# Patient Record
Sex: MALE | Race: WHITE
[De-identification: names, ages, dates, MRNs, and addresses within clinical notes are randomized per-mention and may not be internally consistent; named-entity substitution may affect disease eponyms.]

---

## 2023-04-11 ENCOUNTER — HOSPITAL ENCOUNTER (OUTPATIENT)
Dept: HOSPITAL 47 - RADXRMAIN | Age: 53
Discharge: HOME | End: 2023-04-11
Attending: FAMILY MEDICINE
Payer: COMMERCIAL

## 2023-04-11 DIAGNOSIS — M47.814: ICD-10-CM

## 2023-04-11 DIAGNOSIS — M43.16: ICD-10-CM

## 2023-04-11 DIAGNOSIS — M51.37: Primary | ICD-10-CM

## 2023-04-11 DIAGNOSIS — M47.816: ICD-10-CM

## 2023-04-11 PROCEDURE — 72072 X-RAY EXAM THORAC SPINE 3VWS: CPT

## 2023-04-11 PROCEDURE — 72110 X-RAY EXAM L-2 SPINE 4/>VWS: CPT

## 2023-04-11 NOTE — XR
EXAM TYPE: LUMBAR SPINE X RAY SERIES

 

COMPARISON: NONE

 

HISTORY: Pain

 

TECHNIQUE: 4 views are submitted.

 

FINDINGS:

There is multilevel moderate to severe degenerative disc is most marked at L5-S1 grade 1 anterolisthe
sis and bilateral spondylolysis. Foraminal encroachment L4-5 and L5-S1 noted. There is severe superio
r endplate compression fracture of T12 indeterminate age.

 

IMPRESSION:

1. Superior endplate compression fracture with approximately 20% reduction in vertebral body height T
12 indeterminate age consider follow-up MRI.

2. Multilevel degenerative disc disease most marked at L5-S1 with bilateral spondylolysis and grade 1
 anterolisthesis.

## 2023-04-11 NOTE — XR
EXAMINATION TYPE: XR thoracic spine complete

 

DATE OF EXAM: 4/11/2023

 

COMPARISON: NONE

 

HISTORY: Pain

 

TECHNIQUE: 3 views submitted

 

FINDINGS:

Alignment is anatomic.  There is no compression deformities. There is hypertrophic and degenerative c
hanges of the spine most marked involving the mid and lower thoracic spine.

 

IMPRESSION:

1. Multilevel moderate to severe degenerative disease mid and lower thoracic spine.

2. There is a vague area of nodularity adjacent to the anterior margin of the right first rib. Recomm
end chest x-ray with apical lordotic view.

## 2025-03-24 ENCOUNTER — HOSPITAL ENCOUNTER (OUTPATIENT)
Dept: HOSPITAL 47 - PNWHC3 | Age: 55
End: 2025-03-24
Attending: ANESTHESIOLOGY
Payer: COMMERCIAL

## 2025-03-24 VITALS
DIASTOLIC BLOOD PRESSURE: 68 MMHG | RESPIRATION RATE: 16 BRPM | HEART RATE: 80 BPM | TEMPERATURE: 97.2 F | SYSTOLIC BLOOD PRESSURE: 101 MMHG

## 2025-03-24 DIAGNOSIS — M43.17: Primary | ICD-10-CM

## 2025-03-24 PROCEDURE — 72100 X-RAY EXAM L-S SPINE 2/3 VWS: CPT

## 2025-03-24 PROCEDURE — 99202 OFFICE O/P NEW SF 15 MIN: CPT

## 2025-03-24 NOTE — XR
EXAMINATION TYPE: XR lumbar spine 2 or 3V

 

DATE OF EXAM: 3/24/2025 10:15 AM

 

COMPARISON: 4/11/2023

 

CLINICAL INDICATION: Male, 54 years old with history of M54.51; PHH, pain

 

TECHNIQUE: XR lumbar spine 2 or 3V - Frontal, lateral and coned in L5-S1 lateral views of the spine.

 

FINDINGS: No evidence of any acute osseous pathology.  No evidence of loss of vertebral body height i
s seen. There is normal alignment of the lumbar vertebral bodies. Scattered disc space narrowing. Mul
tilevel large osteophyte formation throughout the visualized spine. There is facet joint arthropathy 
throughout the spine. Scattered at least mild neural foraminal stenosis.

 

IMPRESSION: 

1. No acute fracture.

2. Similar moderate multilevel disc degeneration.

 

[facility], Workstation: [id number], 3/24/2025 10:46 AM

## 2025-03-24 NOTE — P.PAINPG
PQRS Measure Charge Sheet


Comment: 





HISTORY OF PRESENT ILLNESS:


A 54 yr old male as a referral from Dr Nunez presents today w severe and 

chronic BLP> 2 yrs secondary to radiculopathy, spondylosis and facet arthropathy

without myelopathy for evaluation. Pt states pain level is provoked at 8 /10 in 

intensity, constant, localized in the lumbar spine, predominantly axial, achy in

character w occasional shooting pain towards the back of the LEs.  Pain is 

provoked by over activity.  Pain is alleviated by PT x 6 wks which ended in 2023

(which didn't help), physician guided home exercises 4-5 times weekly since Fall 2023, medications, repositioning and rest . Oswestry axial pain score at 23. 





PMH: OA, HTN, Hyperlipidemia, NIDDM II, Anxiety


PSH: Denies


SH: Daily tobacco use, No ETOH abuse, No illicit drug use


FH: Non contributory


All: See list


Meds: See list incl Tyl #4 BID, Neurontin 800mg TID, Robaxin 750mg, Toradol, 

Ibu, ASA, Ozempic, Metformin





REVIEW OF ORGAN SYSTEMS:


                     CONSTITUTIONAL:  No fevers or chills. No recent weight 

loss.


                     NEUROLOGICAL: +  numbness and tingling along the distal 

extremities. No seizure disorders or headaches.


                     MUSCULOSKELETAL: + pain 


                     PSYCHIATRIC:  Denies current depression or suicidal 

thoughts.


    


Physical Examinations  :


                Constitutional : Cooperative , not in acute distress .          

                                                                                

                                                                                

                                                                                

                                                                                

     


                Neurologic :   Cranial nerve II   to  XII  intact. No focal 

neurological deficits.


                Psychiatric : alert & oriented  x 3. Matching mood & appropriate

affect. Judgment & insight intact. 


                Musculoskeletal :     


                               Cervical Spine 


                                         Motor strength in the deltoid and 

biceps: Normal  right side. Normal  Left side


                                         Motor strength biceps and the wrist 

extensors:   Normal right side . Normal left side 


                                         Motor strength in the triceps muscle: 

Normal right side.  Normal  left side  


                                         Deep tendon reflexes:  Normal at the 

biceps. Normal at Brachioradialis. Normal at triceps


                                         Vertebral body tenderness to deep 

palpation over 


                                         Cervical facet loading test: positive 

bilaterally


                                         Spurling test: positive bilaterally


                                         Neck distraction test: positive 

bilaterally


                                         Jasmina sign: positive bilaterally


                                  Lumbar spine


                                         Motor strength lower extremities ,thigh

and legs  5/5 Right side ,  5/5  Left side 


                                         Deep tendon reflexes :   Normal  Knee 

Jerk. Normal   Ankle Jerk  


                                         Vertebral body tenderness over 


                                         Calvo Test positive


                                         Lumbar facet Loading Test: positive 

Right  / positive Left  


                                         Range of motion of the lumbar spine  

Flexion  30 degrees,   extension   10 degrees


                                         Straight Leg Raise test: Left/ Right 

positive at   degrees   


                                         Kiki test: positive right /  positive 

left.


                                         Severe tenderness over the Sacroiliac 

joint  on the Right / Left  sides   


                                         Gaenslen  test: positive bilaterally


                                         Seated flexion test: positive 

bilaterally.


                                 Sacral spine :


                                         Severe tenderness over the Sacroiliac 

joint:  right side / left side 


                                         Range of motion: Flexion of the lumbar 

spine <60 degrees


                                         Range of motion: Extension of the 

lumbar spine <20 degrees


                                         Gaenslen's Test positive 


                                         Kiki test: positive  right side /  

left side


                                         Thigh Thrust Test


                                         Sacral Thrust Test


Imaging:


X ray lumbar spine from 4/11/23 reviewed





Assessment/ Plan : 


L5-S1 anterolisthesis


Recommendation of x ray lumbar spine M54.16. All questions answered.


I have spent greater than 30 minutes on patient care today. Dr Agarwal was 

available by phone for the evaluation of this patient. The time was used to 

review the medical records including relevant urine studies and Prescription 

history (MAPs), review of the available imaging, evaluation and examination of 

the patient, coordination of care with the medical staff and if applicable 

referring physicians, as well as creation of the medical record


   





Controlled Substance Measures





- Controlled Substance Measures


Is patient prescribed a controlled substance at discharge?: No

## 2025-05-03 ENCOUNTER — HOSPITAL ENCOUNTER (OUTPATIENT)
Dept: HOSPITAL 47 - RADMRIMAIN | Age: 55
Discharge: HOME | End: 2025-05-03
Attending: ANESTHESIOLOGY
Payer: COMMERCIAL

## 2025-05-03 DIAGNOSIS — M47.27: Primary | ICD-10-CM

## 2025-05-03 DIAGNOSIS — M43.16: ICD-10-CM

## 2025-05-03 PROCEDURE — 72148 MRI LUMBAR SPINE W/O DYE: CPT

## 2025-05-05 ENCOUNTER — HOSPITAL ENCOUNTER (OUTPATIENT)
Dept: HOSPITAL 47 - PNWHC3 | Age: 55
End: 2025-05-05
Attending: ANESTHESIOLOGY
Payer: COMMERCIAL

## 2025-05-05 VITALS
SYSTOLIC BLOOD PRESSURE: 112 MMHG | TEMPERATURE: 98 F | HEART RATE: 76 BPM | DIASTOLIC BLOOD PRESSURE: 77 MMHG | RESPIRATION RATE: 16 BRPM

## 2025-05-05 DIAGNOSIS — M47.26: ICD-10-CM

## 2025-05-05 DIAGNOSIS — M43.17: ICD-10-CM

## 2025-05-05 DIAGNOSIS — M48.061: ICD-10-CM

## 2025-05-05 DIAGNOSIS — M43.16: Primary | ICD-10-CM

## 2025-05-05 PROCEDURE — 99211 OFF/OP EST MAY X REQ PHY/QHP: CPT

## 2025-06-06 ENCOUNTER — HOSPITAL ENCOUNTER (OUTPATIENT)
Dept: HOSPITAL 47 - ORPAIN | Age: 55
Discharge: HOME | End: 2025-06-06
Payer: COMMERCIAL

## 2025-06-06 VITALS — TEMPERATURE: 98.1 F | RESPIRATION RATE: 16 BRPM

## 2025-06-06 VITALS — DIASTOLIC BLOOD PRESSURE: 80 MMHG | SYSTOLIC BLOOD PRESSURE: 105 MMHG | HEART RATE: 80 BPM

## 2025-06-06 VITALS — BODY MASS INDEX: 36.1 KG/M2

## 2025-06-06 DIAGNOSIS — M47.26: Primary | ICD-10-CM

## 2025-06-06 DIAGNOSIS — G89.29: ICD-10-CM

## 2025-06-06 LAB — GLUCOSE BLD-MCNC: 89 MG/DL (ref 70–110)

## 2025-06-06 PROCEDURE — 62323 NJX INTERLAMINAR LMBR/SAC: CPT

## 2025-06-06 RX ADMIN — ALPRAZOLAM STA MG: 0.5 TABLET ORAL at 06:49

## 2025-06-23 ENCOUNTER — HOSPITAL ENCOUNTER (OUTPATIENT)
Dept: HOSPITAL 47 - PNWHC3 | Age: 55
End: 2025-06-23
Attending: ANESTHESIOLOGY
Payer: COMMERCIAL

## 2025-06-23 VITALS — SYSTOLIC BLOOD PRESSURE: 125 MMHG | HEART RATE: 73 BPM | DIASTOLIC BLOOD PRESSURE: 76 MMHG | RESPIRATION RATE: 16 BRPM

## 2025-06-23 DIAGNOSIS — M54.16: Primary | ICD-10-CM

## 2025-06-23 PROCEDURE — 99211 OFF/OP EST MAY X REQ PHY/QHP: CPT
